# Patient Record
Sex: MALE | Race: WHITE
[De-identification: names, ages, dates, MRNs, and addresses within clinical notes are randomized per-mention and may not be internally consistent; named-entity substitution may affect disease eponyms.]

---

## 2017-08-01 NOTE — CODING QUERY MEDICAL NECESSITY
SUPPORTING DIAGNOSIS NEEDED



Dr. Obrien,



A supporting diagnosis is required for the test/procedure performed on this patient in 
order for us to be reimbursed by the patient's insurance. Please provide a supporting 
diagnosis for the following test/procedure listed below next to the test name along with 
your signature. 



*If there is no additional diagnosis for this patient that would support the following 
test/procedure please document that below next to the test/procedure.



Test(s)/Procedure(s) that require a supporting diagnosis:





* [address] PSA            DIAGNOSIS:





***DATE OF SERVICE: 7/27/17***





Provider Signature:  ______________________________  Date:  _______



Thank you  

Angel Rinaldi

[facility] Information Management

Phone:  [phone]

Fax:  [phone]



Once completed, please kindly fax back to [phone]



For questions please call [phone]

## 2018-01-04 ENCOUNTER — HOSPITAL ENCOUNTER (OUTPATIENT)
Dept: HOSPITAL 45 - C.LAB1850 | Age: 68
Discharge: HOME | End: 2018-01-04
Attending: INTERNAL MEDICINE
Payer: COMMERCIAL

## 2018-01-04 DIAGNOSIS — I25.10: ICD-10-CM

## 2018-01-04 DIAGNOSIS — Z00.00: Primary | ICD-10-CM

## 2018-01-04 DIAGNOSIS — E11.9: ICD-10-CM

## 2018-01-04 DIAGNOSIS — E78.00: ICD-10-CM

## 2018-01-04 LAB
ALBUMIN SERPL-MCNC: 3.8 GM/DL (ref 3.4–5)
ALP SERPL-CCNC: 116 U/L (ref 45–117)
ALT SERPL-CCNC: 22 U/L (ref 12–78)
AST SERPL-CCNC: 17 U/L (ref 15–37)
BASOPHILS # BLD: 0.02 K/UL (ref 0–0.2)
BASOPHILS NFR BLD: 0.2 %
BUN SERPL-MCNC: 21 MG/DL (ref 7–18)
CALCIUM SERPL-MCNC: 9.2 MG/DL (ref 8.5–10.1)
CO2 SERPL-SCNC: 28 MMOL/L (ref 21–32)
CREAT SERPL-MCNC: 0.93 MG/DL (ref 0.6–1.4)
EOS ABS #: 0.66 K/UL (ref 0–0.5)
EOSINOPHIL NFR BLD AUTO: 265 K/UL (ref 130–400)
GLUCOSE SERPL-MCNC: 100 MG/DL (ref 70–99)
HBA1C MFR BLD: 6.7 % (ref 4.5–5.6)
HCT VFR BLD CALC: 38.8 % (ref 42–52)
HGB BLD-MCNC: 12.4 G/DL (ref 14–18)
IG#: 0.04 K/UL (ref 0–0.02)
IMM GRANULOCYTES NFR BLD AUTO: 28.9 %
KETONES UR QL STRIP: 66 MG/DL
LYMPHOCYTES # BLD: 2.61 K/UL (ref 1.2–3.4)
MCH RBC QN AUTO: 29.5 PG (ref 25–34)
MCHC RBC AUTO-ENTMCNC: 32 G/DL (ref 32–36)
MCV RBC AUTO: 92.2 FL (ref 80–100)
MONO ABS #: 0.82 K/UL (ref 0.11–0.59)
MONOCYTES NFR BLD: 9.1 %
NEUT ABS #: 4.88 K/UL (ref 1.4–6.5)
NEUTROPHILS # BLD AUTO: 7.3 %
NEUTROPHILS NFR BLD AUTO: 54.1 %
PH UR: 136 MG/DL (ref 0–200)
PMV BLD AUTO: 10.4 FL (ref 7.4–10.4)
POTASSIUM SERPL-SCNC: 4.7 MMOL/L (ref 3.5–5.1)
PROT SERPL-MCNC: 7.6 GM/DL (ref 6.4–8.2)
RED CELL DISTRIBUTION WIDTH CV: 13.5 % (ref 11.5–14.5)
RED CELL DISTRIBUTION WIDTH SD: 44.9 FL (ref 36.4–46.3)
SODIUM SERPL-SCNC: 137 MMOL/L (ref 136–145)
WBC # BLD AUTO: 9.03 K/UL (ref 4.8–10.8)

## 2018-08-07 ENCOUNTER — HOSPITAL ENCOUNTER (OUTPATIENT)
Dept: HOSPITAL 45 - C.LAB1850 | Age: 68
Discharge: HOME | End: 2018-08-07
Attending: INTERNAL MEDICINE
Payer: COMMERCIAL
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
## 2018-08-07 DIAGNOSIS — D64.9: ICD-10-CM

## 2018-08-07 DIAGNOSIS — I25.10: ICD-10-CM

## 2018-08-07 DIAGNOSIS — T14.8XXA: ICD-10-CM

## 2018-08-07 DIAGNOSIS — E11.9: Primary | ICD-10-CM

## 2018-08-07 DIAGNOSIS — E78.00: ICD-10-CM

## 2018-08-07 LAB
ALBUMIN SERPL-MCNC: 4.1 GM/DL (ref 3.4–5)
ALP SERPL-CCNC: 88 U/L (ref 45–117)
ALT SERPL-CCNC: 24 U/L (ref 12–78)
AST SERPL-CCNC: 21 U/L (ref 15–37)
BASOPHILS # BLD: 0.02 K/UL (ref 0–0.2)
BASOPHILS NFR BLD: 0.3 %
BUN SERPL-MCNC: 30 MG/DL (ref 7–18)
CALCIUM SERPL-MCNC: 9.2 MG/DL (ref 8.5–10.1)
CO2 SERPL-SCNC: 28 MMOL/L (ref 21–32)
CREAT SERPL-MCNC: 0.94 MG/DL (ref 0.6–1.4)
CREAT UR-MCNC: 353 MG/DL
EOS ABS #: 0.64 K/UL (ref 0–0.5)
EOSINOPHIL NFR BLD AUTO: 198 K/UL (ref 130–400)
GLUCOSE SERPL-MCNC: 99 MG/DL (ref 70–99)
HBA1C MFR BLD: 6.5 % (ref 4.5–5.6)
HCT VFR BLD CALC: 37.1 % (ref 42–52)
HGB BLD-MCNC: 12.1 G/DL (ref 14–18)
IG#: 0.02 K/UL (ref 0–0.02)
IMM GRANULOCYTES NFR BLD AUTO: 30.6 %
KETONES UR QL STRIP: 75 MG/DL
LYMPHOCYTES # BLD: 2.1 K/UL (ref 1.2–3.4)
MCH RBC QN AUTO: 30.2 PG (ref 25–34)
MCHC RBC AUTO-ENTMCNC: 32.6 G/DL (ref 32–36)
MCV RBC AUTO: 92.5 FL (ref 80–100)
MONO ABS #: 0.69 K/UL (ref 0.11–0.59)
MONOCYTES NFR BLD: 10.1 %
NEUT ABS #: 3.39 K/UL (ref 1.4–6.5)
NEUTROPHILS # BLD AUTO: 9.3 %
NEUTROPHILS NFR BLD AUTO: 49.4 %
PH UR: 144 MG/DL (ref 0–200)
PMV BLD AUTO: 11.5 FL (ref 7.4–10.4)
POTASSIUM SERPL-SCNC: 4.5 MMOL/L (ref 3.5–5.1)
PROT SERPL-MCNC: 7.2 GM/DL (ref 6.4–8.2)
RED CELL DISTRIBUTION WIDTH CV: 13.8 % (ref 11.5–14.5)
RED CELL DISTRIBUTION WIDTH SD: 46.4 FL (ref 36.4–46.3)
SODIUM SERPL-SCNC: 140 MMOL/L (ref 136–145)
WBC # BLD AUTO: 6.86 K/UL (ref 4.8–10.8)